# Patient Record
Sex: MALE | Race: WHITE | NOT HISPANIC OR LATINO | Employment: OTHER | URBAN - METROPOLITAN AREA
[De-identification: names, ages, dates, MRNs, and addresses within clinical notes are randomized per-mention and may not be internally consistent; named-entity substitution may affect disease eponyms.]

---

## 2023-09-30 PROBLEM — S82.402A: Status: ACTIVE | Noted: 2023-09-30

## 2023-09-30 PROBLEM — N18.30 CHRONIC KIDNEY DISEASE (CKD), STAGE III (MODERATE) (MULTI): Status: ACTIVE | Noted: 2023-09-30

## 2023-09-30 PROBLEM — M48.061 FORAMINAL STENOSIS OF LUMBAR REGION: Status: ACTIVE | Noted: 2023-09-30

## 2023-09-30 PROBLEM — E83.52 HYPERCALCEMIA: Status: ACTIVE | Noted: 2023-09-30

## 2023-09-30 PROBLEM — M47.22 CERVICAL SPONDYLOSIS WITH RADICULOPATHY: Status: ACTIVE | Noted: 2023-09-30

## 2023-09-30 PROBLEM — E78.5 HYPERLIPIDEMIA: Status: ACTIVE | Noted: 2023-09-30

## 2023-09-30 PROBLEM — D49.2 NERVE SHEATH TUMOR: Status: ACTIVE | Noted: 2023-09-30

## 2023-09-30 PROBLEM — R79.89 ELEVATED SERUM CREATININE: Status: ACTIVE | Noted: 2023-09-30

## 2023-09-30 PROBLEM — M85.80 OSTEOPENIA: Status: ACTIVE | Noted: 2023-09-30

## 2023-09-30 PROBLEM — M41.20 IDIOPATHIC SCOLIOSIS AND KYPHOSCOLIOSIS: Status: ACTIVE | Noted: 2023-09-30

## 2023-09-30 PROBLEM — N20.0 CALCULUS OF KIDNEY: Status: ACTIVE | Noted: 2023-09-30

## 2023-09-30 PROBLEM — M54.2 CERVICALGIA: Status: ACTIVE | Noted: 2023-09-30

## 2023-09-30 PROBLEM — F17.200 CURRENT EVERY DAY SMOKER: Status: ACTIVE | Noted: 2023-09-30

## 2023-09-30 PROBLEM — E79.0 HYPERURICEMIA: Status: ACTIVE | Noted: 2023-09-30

## 2023-09-30 PROBLEM — R91.1 PULMONARY NODULE: Status: ACTIVE | Noted: 2023-09-30

## 2023-09-30 PROBLEM — I72.3 ANEURYSM ARTERY, ILIAC (CMS-HCC): Status: ACTIVE | Noted: 2023-09-30

## 2023-09-30 PROBLEM — Z98.1 STATUS POST CERVICAL SPINAL FUSION: Status: ACTIVE | Noted: 2023-09-30

## 2023-09-30 PROBLEM — R20.2 PARESTHESIA: Status: ACTIVE | Noted: 2023-09-30

## 2023-09-30 PROBLEM — E83.51 HYPOCALCEMIA: Status: ACTIVE | Noted: 2023-09-30

## 2023-09-30 PROBLEM — E55.9 VITAMIN D DEFICIENCY: Status: ACTIVE | Noted: 2023-09-30

## 2023-09-30 PROBLEM — I10 HYPERTENSION: Status: ACTIVE | Noted: 2023-09-30

## 2023-09-30 PROBLEM — E87.6 HYPOKALEMIA: Status: ACTIVE | Noted: 2023-09-30

## 2023-09-30 PROBLEM — M85.89 OTHER SPECIFIED DISORDERS OF BONE DENSITY AND STRUCTURE, MULTIPLE SITES: Status: ACTIVE | Noted: 2023-09-30

## 2023-09-30 PROBLEM — S82.401A: Status: ACTIVE | Noted: 2023-09-30

## 2023-09-30 RX ORDER — TAMSULOSIN HYDROCHLORIDE 0.4 MG/1
1 CAPSULE ORAL DAILY
COMMUNITY
Start: 2015-07-15

## 2023-09-30 RX ORDER — TRAMADOL HYDROCHLORIDE 50 MG/1
50 TABLET ORAL 3 TIMES DAILY PRN
COMMUNITY
End: 2023-10-23 | Stop reason: ALTCHOICE

## 2023-09-30 RX ORDER — VARENICLINE TARTRATE 0.5 (11)-1
KIT ORAL
COMMUNITY
Start: 2022-09-02 | End: 2023-10-23 | Stop reason: ALTCHOICE

## 2023-09-30 RX ORDER — VARENICLINE TARTRATE 1 MG/1
1 TABLET, FILM COATED ORAL 2 TIMES DAILY
COMMUNITY
Start: 2022-09-02 | End: 2023-10-23 | Stop reason: ALTCHOICE

## 2023-09-30 RX ORDER — PRAVASTATIN SODIUM 20 MG/1
1 TABLET ORAL NIGHTLY
COMMUNITY
Start: 2018-03-11 | End: 2023-10-23 | Stop reason: SDUPTHER

## 2023-09-30 RX ORDER — TRAMADOL HYDROCHLORIDE 50 MG/1
50 TABLET ORAL EVERY 12 HOURS PRN
COMMUNITY

## 2023-09-30 RX ORDER — PREDNISONE 5 MG/1
1 TABLET ORAL DAILY
COMMUNITY

## 2023-09-30 RX ORDER — PREDNISONE 2.5 MG/1
2.5 TABLET ORAL DAILY
COMMUNITY
Start: 2023-01-30

## 2023-09-30 RX ORDER — OXYCODONE AND ACETAMINOPHEN 5; 325 MG/1; MG/1
1 TABLET ORAL NIGHTLY
COMMUNITY
Start: 2023-04-12

## 2023-09-30 RX ORDER — DICLOFENAC SODIUM 10 MG/G
4 GEL TOPICAL 4 TIMES DAILY PRN
COMMUNITY

## 2023-09-30 RX ORDER — IRBESARTAN 75 MG/1
1 TABLET ORAL DAILY
COMMUNITY
Start: 2022-06-13 | End: 2024-03-18

## 2023-10-05 ENCOUNTER — APPOINTMENT (OUTPATIENT)
Dept: PRIMARY CARE | Facility: CLINIC | Age: 68
End: 2023-10-05
Payer: MEDICARE

## 2023-10-23 ENCOUNTER — OFFICE VISIT (OUTPATIENT)
Dept: PRIMARY CARE | Facility: CLINIC | Age: 68
End: 2023-10-23
Payer: MEDICARE

## 2023-10-23 VITALS
WEIGHT: 152 LBS | SYSTOLIC BLOOD PRESSURE: 122 MMHG | BODY MASS INDEX: 23.86 KG/M2 | HEART RATE: 71 BPM | HEIGHT: 67 IN | DIASTOLIC BLOOD PRESSURE: 72 MMHG | TEMPERATURE: 98 F

## 2023-10-23 DIAGNOSIS — E78.5 HYPERLIPIDEMIA, UNSPECIFIED HYPERLIPIDEMIA TYPE: Primary | ICD-10-CM

## 2023-10-23 DIAGNOSIS — I10 PRIMARY HYPERTENSION: ICD-10-CM

## 2023-10-23 PROCEDURE — 3078F DIAST BP <80 MM HG: CPT | Performed by: FAMILY MEDICINE

## 2023-10-23 PROCEDURE — 1125F AMNT PAIN NOTED PAIN PRSNT: CPT | Performed by: FAMILY MEDICINE

## 2023-10-23 PROCEDURE — 99213 OFFICE O/P EST LOW 20 MIN: CPT | Performed by: FAMILY MEDICINE

## 2023-10-23 PROCEDURE — 1159F MED LIST DOCD IN RCRD: CPT | Performed by: FAMILY MEDICINE

## 2023-10-23 PROCEDURE — 3074F SYST BP LT 130 MM HG: CPT | Performed by: FAMILY MEDICINE

## 2023-10-23 RX ORDER — PRAVASTATIN SODIUM 20 MG/1
20 TABLET ORAL NIGHTLY
Qty: 90 TABLET | Refills: 1 | Status: SHIPPED | OUTPATIENT
Start: 2023-10-23 | End: 2024-03-21 | Stop reason: SDUPTHER

## 2023-10-23 RX ORDER — SOD SULF/POT CHLORIDE/MAG SULF 1.479 G
TABLET ORAL
COMMUNITY
Start: 2023-05-11 | End: 2023-10-23 | Stop reason: ALTCHOICE

## 2023-10-23 ASSESSMENT — PATIENT HEALTH QUESTIONNAIRE - PHQ9
2. FEELING DOWN, DEPRESSED OR HOPELESS: NOT AT ALL
SUM OF ALL RESPONSES TO PHQ9 QUESTIONS 1 AND 2: 0
1. LITTLE INTEREST OR PLEASURE IN DOING THINGS: NOT AT ALL

## 2023-10-23 NOTE — PROGRESS NOTES
"    /72 (BP Location: Left arm, Patient Position: Sitting, BP Cuff Size: Adult)   Pulse 71   Temp 36.7 °C (98 °F) (Skin)   Ht 1.689 m (5' 6.5\")   Wt 68.9 kg (152 lb)   BMI 24.17 kg/m²     No past medical history on file.    Patient Active Problem List   Diagnosis    Vitamin D deficiency    Status post cervical spinal fusion    Pulmonary nodule    Paresthesia    Other specified disorders of bone density and structure, multiple sites    Osteopenia    Nerve sheath tumor    Hypokalemia    Hyperuricemia    Hypertension    Hyperlipidemia    Hypercalcemia    Foraminal stenosis of lumbar region    Current every day smoker    Chronic kidney disease (CKD), stage III (moderate) (CMS/HCC)    Cervicalgia    Cervical spondylosis with radiculopathy    Calculus of kidney    Bilateral fibular fractures    Aneurysm artery, iliac (CMS/HCC)    Idiopathic scoliosis and kyphoscoliosis    Body mass index (BMI) 24.0-24.9, adult    Elevated serum creatinine       Current Outpatient Medications   Medication Sig Dispense Refill    diclofenac sodium (Voltaren) 1 % gel gel Apply 1 Application topically 4 times a day as needed.      irbesartan (Avapro) 75 mg tablet Take 1 tablet (75 mg) by mouth once daily.      oxyCODONE-acetaminophen (Percocet) 5-325 mg tablet 1 tablet once daily at bedtime.      pravastatin (Pravachol) 20 mg tablet Take 1 tablet (20 mg) by mouth once daily at bedtime.      predniSONE (Deltasone) 2.5 mg tablet 1 tablet (2.5 mg) once daily.      tamsulosin (Flomax) 0.4 mg 24 hr capsule Take 1 capsule (0.4 mg) by mouth once daily.      traMADol (Ultram) 50 mg tablet Take 1 tablet (50 mg) by mouth every 12 hours if needed.      predniSONE (Deltasone) 5 mg tablet Take 1 tablet (5 mg) by mouth once daily.      sod sulf-pot chloride-mag sulf (Sutab) 1.479-0.188- 0.225 gram tablet       traMADol (Ultram) 50 mg tablet Take 1 tablet (50 mg) by mouth 3 times a day as needed.      varenicline (Chantix BALJINDER) 0.5 mg (11)- 1 mg " (42) tablet Take by mouth.  TAKE ONE 0.5MG TABLET DAILY ON DAYS 1-3, THEN ONE 0.5MG TABLET TWICE DAILY ON DAYS 4-7, THEN ONE 1MG TABLET TWICE DAILY THEREAFTER.      varenicline (Chantix) 1 mg tablet Take 1 tablet (1 mg) by mouth 2 times a day.       No current facility-administered medications for this visit.       CC/HPI/ASSESSMENT/PLAN    CC follow-up medicine    HPI patient suffers of hyperlipidemia, hypertension.  Blood pressure under good control.  Patient request refills for medicine.  Patient dealing with chronic low back pain sees pain management on a regular basis.  Denies fever chills chest pain.  Patient will need blood work ordered    Exam calm neck supple lungs CTA CV RRR    A/P 1 hyperlipidemia chronic stable 2 hypertension chronic stable medicine refilled.  Blood work is ordered.    There are no diagnoses linked to this encounter.

## 2023-11-09 LAB
NON-UH HIE A/G RATIO: 1.4
NON-UH HIE ALB: 4.1 G/DL (ref 3.4–5)
NON-UH HIE ALK PHOS: 48 UNIT/L (ref 45–117)
NON-UH HIE BILIRUBIN, TOTAL: 0.6 MG/DL (ref 0.3–1.2)
NON-UH HIE BUN/CREAT RATIO: 15.6
NON-UH HIE BUN: 25 MG/DL (ref 9–23)
NON-UH HIE CALCIUM: 10.8 MG/DL (ref 8.7–10.4)
NON-UH HIE CALCULATED LDL CHOLESTEROL: 82 MG/DL (ref 60–130)
NON-UH HIE CALCULATED OSMOLALITY: 283 MOSM/KG (ref 275–295)
NON-UH HIE CHLORIDE: 106 MMOL/L (ref 98–107)
NON-UH HIE CHOLESTEROL: 160 MG/DL (ref 100–200)
NON-UH HIE CO2, VENOUS: 27 MMOL/L (ref 20–31)
NON-UH HIE CREATININE: 1.6 MG/DL (ref 0.6–1.1)
NON-UH HIE GFR AA: 52
NON-UH HIE GLOBULIN: 3 G/DL
NON-UH HIE GLOMERULAR FILTRATION RATE: 43 ML/MIN/1.73M?
NON-UH HIE GLUCOSE: 91 MG/DL (ref 74–106)
NON-UH HIE GOT: 25 UNIT/L (ref 15–37)
NON-UH HIE GPT: 22 UNIT/L (ref 10–49)
NON-UH HIE HDL CHOLESTEROL: 58 MG/DL (ref 40–60)
NON-UH HIE K: 4 MMOL/L (ref 3.5–5.1)
NON-UH HIE NA: 140 MMOL/L (ref 135–145)
NON-UH HIE TOTAL CHOL/HDL CHOL RATIO: 2.8
NON-UH HIE TOTAL PROTEIN: 7.1 G/DL (ref 5.7–8.2)
NON-UH HIE TRIGLYCERIDES: 101 MG/DL (ref 30–150)

## 2023-12-29 LAB
NON-UH HIE BUN/CREAT RATIO: 18.5
NON-UH HIE BUN: 24 MG/DL (ref 9–23)
NON-UH HIE CALCIUM: 10.8 MG/DL (ref 8.7–10.4)
NON-UH HIE CALCULATED OSMOLALITY: 280 MOSM/KG (ref 275–295)
NON-UH HIE CHLORIDE: 107 MMOL/L (ref 98–107)
NON-UH HIE CO2, VENOUS: 24 MMOL/L (ref 20–31)
NON-UH HIE CREATININE: 1.3 MG/DL (ref 0.6–1.1)
NON-UH HIE GFR AA: >60
NON-UH HIE GLOMERULAR FILTRATION RATE: 55 ML/MIN/1.73M?
NON-UH HIE GLUCOSE: 93 MG/DL (ref 74–106)
NON-UH HIE I-PTH: 19 PG/ML (ref 18.4–80.1)
NON-UH HIE K: 4.7 MMOL/L (ref 3.5–5.1)
NON-UH HIE NA: 138 MMOL/L (ref 135–145)
NON-UH HIE PHOSPHORUS: 3 MG/DL (ref 2–5.1)
NON-UH HIE VIT D 25: 23 NG/ML

## 2024-01-18 ENCOUNTER — OFFICE VISIT (OUTPATIENT)
Dept: NEPHROLOGY | Facility: CLINIC | Age: 69
End: 2024-01-18
Payer: MEDICARE

## 2024-01-18 VITALS
BODY MASS INDEX: 24.01 KG/M2 | SYSTOLIC BLOOD PRESSURE: 132 MMHG | DIASTOLIC BLOOD PRESSURE: 71 MMHG | HEIGHT: 67 IN | HEART RATE: 85 BPM | WEIGHT: 153 LBS

## 2024-01-18 DIAGNOSIS — N18.31 STAGE 3A CHRONIC KIDNEY DISEASE (MULTI): Primary | ICD-10-CM

## 2024-01-18 PROCEDURE — 99213 OFFICE O/P EST LOW 20 MIN: CPT | Performed by: INTERNAL MEDICINE

## 2024-01-18 PROCEDURE — 3075F SYST BP GE 130 - 139MM HG: CPT | Performed by: INTERNAL MEDICINE

## 2024-01-18 PROCEDURE — 3078F DIAST BP <80 MM HG: CPT | Performed by: INTERNAL MEDICINE

## 2024-01-18 PROCEDURE — 1125F AMNT PAIN NOTED PAIN PRSNT: CPT | Performed by: INTERNAL MEDICINE

## 2024-01-18 PROCEDURE — 1159F MED LIST DOCD IN RCRD: CPT | Performed by: INTERNAL MEDICINE

## 2024-01-18 NOTE — PROGRESS NOTES
Augustin Swansonjasonaileen   68 y.o.    @@  [facility]/Room: [id number]/Room/bed info not found    Subjective:   The patient is being seen for a routine clinic follow-up of chronic kidney disease. Recently, the disease has been stable. Disease complications:  No hyperkalemia, no hypocalcemia, no hyperphosphatemia, no metabolic acidosis, no coagulopathy, no uremic encephalopathy, no neuropathy and no renal osteodystrophy. The patient is currently asymptomatic. No associated symptoms are reported.       Meds:   Current Outpatient Medications   Medication Sig Dispense Refill    diclofenac sodium (Voltaren) 1 % gel gel Apply 1 Application topically 4 times a day as needed.      irbesartan (Avapro) 75 mg tablet Take 1 tablet (75 mg) by mouth once daily.      oxyCODONE-acetaminophen (Percocet) 5-325 mg tablet 1 tablet once daily at bedtime.      pravastatin (Pravachol) 20 mg tablet Take 1 tablet (20 mg) by mouth once daily at bedtime. 90 tablet 1    predniSONE (Deltasone) 2.5 mg tablet 1 tablet (2.5 mg) once daily.      predniSONE (Deltasone) 5 mg tablet Take 1 tablet (5 mg) by mouth once daily.      tamsulosin (Flomax) 0.4 mg 24 hr capsule Take 1 capsule (0.4 mg) by mouth once daily.      traMADol (Ultram) 50 mg tablet Take 1 tablet (50 mg) by mouth every 12 hours if needed.       No current facility-administered medications for this visit.          ROS:  The patient is awake and oriented. No dizziness or lightheadedness. No chills and no fever. No headaches. No nausea and no vomiting. No shortness of breath. No cough. No sputum. No chest pain. No chest tightness. No abdominal pain. No diarrhea and no constipation. No hematemesis or hemoptysis. No hematuria. No rectal bleeding. No melena. No epistaxis. No urinary symptoms. No flank pain. No leg edema. No leg pain. No weakness. No itching. Overall, the rest of the review of systems is also negative.  12 point review of systems otherwise negative as stated in HPI.        Physical  "Examination:        Vitals:    01/18/24 0912   BP: 132/71   Pulse: 85     General: The patient is awake, oriented, and is not in any distress.  Head and Neck: Normocephalic. No periorbital edema.  Eyes: non-icteric  Respiratory: Symmetric air entry. Symmetric chest expansion.No respiratory distress.  Cardiovascular: Symmetric peripheral pulses.  Skin: No maculopapular rash.  Abdomen: soft, nt/nd  Musculoskeletal: No peripheral edema in both left and right upper extremities.  No edema in either left or right lower extremities.  Neuro Exam: Speech is fluent. Moves extremities.    Imaging:         Blood Labs:  No results found for this or any previous visit (from the past 24 hour(s)).   No results found for: \"BMPR1A\", \"PTH\", \"PROTUR\", \"PHOS\"   No results found for: \"GLUCOSE\", \"CALCIUM\", \"NA\", \"K\", \"CO2\", \"CL\", \"BUN\", \"CREATININE\"      Assessment and Plan:  1. Chronic kidney disease stage II/III. Last creatinine level is 1.3. Normal K level. Normal bicarb level. Normal PTH, phosphorus, and 25-hydroxy vitamin D level.      2. Hypertension. Blood pressure is under control. Continue the current medications.     3. Lipidemia. He is on pravastatin.    4.  Hypercalcemia.  With normal PTH level.  Probably primary hyperparathyroidism.  He is following with his endocrinologist.     I will see him in about 6 months for follow-up.           Bobo Huber MD     "

## 2024-03-20 PROBLEM — I77.819 AORTIC ECTASIA (CMS-HCC): Status: ACTIVE | Noted: 2024-03-20

## 2024-03-20 PROBLEM — R63.4 WEIGHT LOSS: Status: ACTIVE | Noted: 2024-03-20

## 2024-03-20 PROBLEM — Z86.2 HISTORY OF ANEMIA: Status: ACTIVE | Noted: 2024-03-20

## 2024-03-20 PROBLEM — R10.9 ABDOMINAL PAIN: Status: ACTIVE | Noted: 2024-03-20

## 2024-03-20 PROBLEM — M54.50 LOWER BACK PAIN: Status: ACTIVE | Noted: 2024-03-20

## 2024-03-20 PROBLEM — M15.9 OSTEOARTHRITIS, MULTIPLE SITES: Status: ACTIVE | Noted: 2024-03-20

## 2024-03-20 PROBLEM — N40.0 BPH (BENIGN PROSTATIC HYPERPLASIA): Status: ACTIVE | Noted: 2024-03-20

## 2024-03-20 PROBLEM — N20.1 LEFT URETERAL CALCULUS: Status: ACTIVE | Noted: 2024-03-20

## 2024-03-20 PROBLEM — M96.1 LUMBAR POST-LAMINECTOMY SYNDROME: Status: ACTIVE | Noted: 2023-08-22

## 2024-03-20 PROBLEM — M47.816 LUMBAR SPONDYLOSIS: Status: ACTIVE | Noted: 2023-09-30

## 2024-03-20 PROBLEM — F17.200 TOBACCO DEPENDENCE SYNDROME: Status: ACTIVE | Noted: 2024-03-20

## 2024-03-21 ENCOUNTER — OFFICE VISIT (OUTPATIENT)
Dept: PRIMARY CARE | Facility: CLINIC | Age: 69
End: 2024-03-21
Payer: MEDICARE

## 2024-03-21 VITALS
DIASTOLIC BLOOD PRESSURE: 68 MMHG | SYSTOLIC BLOOD PRESSURE: 108 MMHG | HEIGHT: 67 IN | TEMPERATURE: 97.9 F | HEART RATE: 72 BPM | BODY MASS INDEX: 24.04 KG/M2 | WEIGHT: 153.2 LBS

## 2024-03-21 DIAGNOSIS — E78.5 HYPERLIPIDEMIA, UNSPECIFIED HYPERLIPIDEMIA TYPE: Primary | ICD-10-CM

## 2024-03-21 DIAGNOSIS — H66.92 LEFT OTITIS MEDIA, UNSPECIFIED OTITIS MEDIA TYPE: ICD-10-CM

## 2024-03-21 DIAGNOSIS — I10 ESSENTIAL (PRIMARY) HYPERTENSION: ICD-10-CM

## 2024-03-21 PROCEDURE — 1159F MED LIST DOCD IN RCRD: CPT | Performed by: FAMILY MEDICINE

## 2024-03-21 PROCEDURE — 3078F DIAST BP <80 MM HG: CPT | Performed by: FAMILY MEDICINE

## 2024-03-21 PROCEDURE — 1160F RVW MEDS BY RX/DR IN RCRD: CPT | Performed by: FAMILY MEDICINE

## 2024-03-21 PROCEDURE — 3074F SYST BP LT 130 MM HG: CPT | Performed by: FAMILY MEDICINE

## 2024-03-21 PROCEDURE — 99214 OFFICE O/P EST MOD 30 MIN: CPT | Performed by: FAMILY MEDICINE

## 2024-03-21 RX ORDER — DOXYCYCLINE 100 MG/1
100 CAPSULE ORAL 2 TIMES DAILY
Qty: 20 CAPSULE | Refills: 0 | Status: SHIPPED | OUTPATIENT
Start: 2024-03-21 | End: 2024-03-31

## 2024-03-21 RX ORDER — IRBESARTAN 75 MG/1
75 TABLET ORAL DAILY
Qty: 90 TABLET | Refills: 1 | Status: SHIPPED | OUTPATIENT
Start: 2024-03-21

## 2024-03-21 RX ORDER — PRAVASTATIN SODIUM 20 MG/1
20 TABLET ORAL NIGHTLY
Qty: 90 TABLET | Refills: 1 | Status: SHIPPED | OUTPATIENT
Start: 2024-03-21 | End: 2025-03-21

## 2024-03-21 NOTE — PROGRESS NOTES
"    /68   Pulse 72   Temp 36.6 °C (97.9 °F)   Ht 1.689 m (5' 6.5\")   Wt 69.5 kg (153 lb 3.2 oz)   BMI 24.36 kg/m²     No past medical history on file.    Patient Active Problem List   Diagnosis    Vitamin D deficiency    Status post cervical spinal fusion    Pulmonary nodule    Paresthesia    Other specified disorders of bone density and structure, multiple sites    Osteopenia    Nerve sheath tumor    Hypokalemia    Hyperuricemia    HTN (hypertension)    HLD (hyperlipidemia)    Hypercalcemia    Foraminal stenosis of lumbar region    Current every day smoker    Chronic kidney disease (CKD), stage III (moderate) (CMS/HCC)    Cervicalgia    Lumbar spondylosis    Renal calculi    Bilateral fibular fractures    Iliac artery aneurysm (CMS/HCC)    Idiopathic scoliosis and kyphoscoliosis    Body mass index (BMI) 24.0-24.9, adult    Elevated serum creatinine    Abdominal pain    Anemia    Aortic ectasia (CMS/HCC)    BPH (benign prostatic hyperplasia)    Diaphragmatic hernia    History of anemia    Left ureteral calculus    Lower back pain    Lumbar post-laminectomy syndrome    Osteoarthritis, multiple sites    Tobacco dependence syndrome    Weight loss       Current Outpatient Medications   Medication Sig Dispense Refill    diclofenac sodium (Voltaren) 1 % gel gel Apply 4.5 inches (4 g) topically 4 times a day as needed.      irbesartan (Avapro) 75 mg tablet TAKE 1 TABLET BY MOUTH EVERY DAY 90 tablet 0    oxyCODONE-acetaminophen (Percocet) 5-325 mg tablet 1 tablet once daily at bedtime.      pravastatin (Pravachol) 20 mg tablet Take 1 tablet (20 mg) by mouth once daily at bedtime. 90 tablet 1    predniSONE (Deltasone) 2.5 mg tablet 1 tablet (2.5 mg) once daily.      predniSONE (Deltasone) 5 mg tablet Take 1 tablet (5 mg) by mouth once daily.      tamsulosin (Flomax) 0.4 mg 24 hr capsule Take 1 capsule (0.4 mg) by mouth once daily.      traMADol (Ultram) 50 mg tablet Take 1 tablet (50 mg) by mouth every 12 hours if " needed.       No current facility-administered medications for this visit.       CC/HPI/ASSESSMENT/PLAN    CC follow medication    HPI patient suffers from hypertension hyperlipidemia.  Request refills.  Blood pressure under good control.  Patient continues to smoke.  Denies fever chills chest pain.  Complains of discomfort and a fullness in the left ear for the last few weeks.  He had blood work 4 months ago that showed good control of his cholesterol levels.  His kidney function is abnormal creatinine was 1.6 last check was 1.3.  He has follow-up with nephrology in July.  ROS negative except noted above past medical social surgical history is reviewed    Exam calm neck supple lungs CTA CV RRR Ext no edema ear exam reveals fluid behind left TM canals are clear    A/P 1 hyperlipidemia chronic stable meds refilled.  Blood work ordered.  2 hypertension chronic stable meds refilled blood work ordered.  3 left otitis media.  Doxycycline ordered.  Recommended Flonase twice daily each nostril for 10 days.  Follow-up 6 months    There are no diagnoses linked to this encounter.

## 2024-06-20 LAB
NON-UH HIE A/G RATIO: 1.1
NON-UH HIE ALB: 3.9 G/DL (ref 3.4–5)
NON-UH HIE ALK PHOS: 54 UNIT/L (ref 45–117)
NON-UH HIE BILIRUBIN, TOTAL: 0.5 MG/DL (ref 0.3–1.2)
NON-UH HIE BUN/CREAT RATIO: 15
NON-UH HIE BUN: 21 MG/DL (ref 9–23)
NON-UH HIE CALCIUM: 11.1 MG/DL (ref 8.7–10.4)
NON-UH HIE CALCULATED LDL CHOLESTEROL: 101 MG/DL (ref 60–130)
NON-UH HIE CALCULATED OSMOLALITY: 286 MOSM/KG (ref 275–295)
NON-UH HIE CHLORIDE: 110 MMOL/L (ref 98–107)
NON-UH HIE CHOLESTEROL: 172 MG/DL (ref 100–200)
NON-UH HIE CO2, VENOUS: 26 MMOL/L (ref 20–31)
NON-UH HIE CREATININE: 1.4 MG/DL (ref 0.6–1.1)
NON-UH HIE GFR AA: >60
NON-UH HIE GLOBULIN: 3.4 G/DL
NON-UH HIE GLOMERULAR FILTRATION RATE: 50 ML/MIN/1.73M?
NON-UH HIE GLUCOSE: 93 MG/DL (ref 74–106)
NON-UH HIE GOT: 24 UNIT/L (ref 15–37)
NON-UH HIE GPT: 30 UNIT/L (ref 10–49)
NON-UH HIE HDL CHOLESTEROL: 52 MG/DL (ref 40–60)
NON-UH HIE K: 3.8 MMOL/L (ref 3.5–5.1)
NON-UH HIE NA: 142 MMOL/L (ref 135–145)
NON-UH HIE TOTAL CHOL/HDL CHOL RATIO: 3.3
NON-UH HIE TOTAL PROTEIN: 7.3 G/DL (ref 5.7–8.2)
NON-UH HIE TRIGLYCERIDES: 94 MG/DL (ref 30–150)

## 2024-06-28 ENCOUNTER — TELEPHONE (OUTPATIENT)
Dept: NEPHROLOGY | Facility: CLINIC | Age: 69
End: 2024-06-28
Payer: MEDICARE

## 2024-06-28 DIAGNOSIS — N18.31 STAGE 3A CHRONIC KIDNEY DISEASE (MULTI): Primary | ICD-10-CM

## 2024-06-28 NOTE — TELEPHONE ENCOUNTER
Pt has an apt on 7/18 and is wondering if he needs labs done before next OV. I did not see any inn his chart Please advise. Thank you.

## 2024-07-02 LAB
NON-UH HIE BUN/CREAT RATIO: 16.7
NON-UH HIE BUN: 25 MG/DL (ref 9–23)
NON-UH HIE CALCIUM: 10.8 MG/DL (ref 8.7–10.4)
NON-UH HIE CALCULATED OSMOLALITY: 285 MOSM/KG (ref 275–295)
NON-UH HIE CHLORIDE: 110 MMOL/L (ref 98–107)
NON-UH HIE CO2, VENOUS: 25 MMOL/L (ref 20–31)
NON-UH HIE CREATININE: 1.5 MG/DL (ref 0.6–1.1)
NON-UH HIE GFR AA: 56
NON-UH HIE GLOMERULAR FILTRATION RATE: 46 ML/MIN/1.73M?
NON-UH HIE GLUCOSE: 92 MG/DL (ref 74–106)
NON-UH HIE K: 4 MMOL/L (ref 3.5–5.1)
NON-UH HIE NA: 141 MMOL/L (ref 135–145)

## 2024-07-18 ENCOUNTER — APPOINTMENT (OUTPATIENT)
Dept: NEPHROLOGY | Facility: CLINIC | Age: 69
End: 2024-07-18
Payer: MEDICARE

## 2024-07-18 ENCOUNTER — OFFICE VISIT (OUTPATIENT)
Dept: NEPHROLOGY | Facility: CLINIC | Age: 69
End: 2024-07-18
Payer: MEDICARE

## 2024-07-18 VITALS
DIASTOLIC BLOOD PRESSURE: 78 MMHG | HEIGHT: 67 IN | SYSTOLIC BLOOD PRESSURE: 131 MMHG | BODY MASS INDEX: 23.7 KG/M2 | WEIGHT: 151 LBS | HEART RATE: 71 BPM

## 2024-07-18 DIAGNOSIS — N18.31 STAGE 3A CHRONIC KIDNEY DISEASE (MULTI): Primary | ICD-10-CM

## 2024-07-18 PROCEDURE — 1159F MED LIST DOCD IN RCRD: CPT | Performed by: INTERNAL MEDICINE

## 2024-07-18 PROCEDURE — 3078F DIAST BP <80 MM HG: CPT | Performed by: INTERNAL MEDICINE

## 2024-07-18 PROCEDURE — 3008F BODY MASS INDEX DOCD: CPT | Performed by: INTERNAL MEDICINE

## 2024-07-18 PROCEDURE — 99213 OFFICE O/P EST LOW 20 MIN: CPT | Performed by: INTERNAL MEDICINE

## 2024-07-18 PROCEDURE — 3075F SYST BP GE 130 - 139MM HG: CPT | Performed by: INTERNAL MEDICINE

## 2024-07-18 NOTE — PROGRESS NOTES
Augustin Alireza   68 y.o.    @@  [facility]/Room: [id number]/Room/bed info not found    Subjective:   The patient is being seen for a routine clinic follow-up of chronic kidney disease. Recently, the disease has been stable. Disease complications:  No hyperkalemia, no hypocalcemia, no hyperphosphatemia, no metabolic acidosis, no coagulopathy, no uremic encephalopathy, no neuropathy and no renal osteodystrophy. The patient is currently asymptomatic. No associated symptoms are reported.       Meds:   Current Outpatient Medications   Medication Sig Dispense Refill    diclofenac sodium (Voltaren) 1 % gel gel Apply 4.5 inches (4 g) topically 4 times a day as needed.      irbesartan (Avapro) 75 mg tablet Take 1 tablet (75 mg) by mouth once daily. 90 tablet 1    oxyCODONE-acetaminophen (Percocet) 5-325 mg tablet 1 tablet once daily at bedtime.      pravastatin (Pravachol) 20 mg tablet Take 1 tablet (20 mg) by mouth once daily at bedtime. 90 tablet 1    predniSONE (Deltasone) 2.5 mg tablet 1 tablet (2.5 mg) once daily.      predniSONE (Deltasone) 5 mg tablet Take 1 tablet (5 mg) by mouth once daily.      tamsulosin (Flomax) 0.4 mg 24 hr capsule Take 1 capsule (0.4 mg) by mouth once daily.      traMADol (Ultram) 50 mg tablet Take 1 tablet (50 mg) by mouth every 12 hours if needed.       No current facility-administered medications for this visit.          ROS:  The patient is awake and oriented. No dizziness or lightheadedness. No chills and no fever. No headaches. No nausea and no vomiting. No shortness of breath. No cough. No sputum. No chest pain. No chest tightness. No abdominal pain. No diarrhea and no constipation. No hematemesis or hemoptysis. No hematuria. No rectal bleeding. No melena. No epistaxis. No urinary symptoms. No flank pain. No leg edema. No leg pain. No weakness. No itching. Overall, the rest of the review of systems is also negative.  12 point review of systems otherwise negative as stated in  "HPI.        Physical Examination:        Vitals:    07/18/24 1425   BP: 131/78   Pulse: 71     General: The patient is awake, oriented, and is not in any distress.  Head and Neck: Normocephalic. No periorbital edema.  Eyes: non-icteric  Respiratory: Symmetric air entry. Symmetric chest expansion.No respiratory distress.  Cardiovascular: Symmetric peripheral pulses.  Skin: No maculopapular rash.  Abdomen: soft, nt/nd  Musculoskeletal: No peripheral edema in both left and right upper extremities.  No edema in either left or right lower extremities.  Neuro Exam: Speech is fluent. Moves extremities.    Imaging:         Blood Labs:  No results found for this or any previous visit (from the past 24 hour(s)).   No results found for: \"BMPR1A\", \"PTH\", \"PROTUR\", \"PHOS\"   No results found for: \"GLUCOSE\", \"CALCIUM\", \"NA\", \"K\", \"CO2\", \"CL\", \"BUN\", \"CREATININE\"      Assessment and Plan:  1. Chronic kidney disease stage II/III. Last creatinine level is 1.4. Normal K level. Normal bicarb level.      2. Hypertension. Blood pressure is under control. Continue the current medications.     3. Lipidemia. He is on pravastatin.     4.  Hypercalcemia.  With normal PTH level.  Probably primary hyperparathyroidism.  He is following with his endocrinologist.     I will see him in about 6 months for follow-up.           Bobo Huber MD  Senior Attending Physician  Director of Onco-Nephrology Program  Division of Nephrology & Hypertension  [facility]  "

## 2024-10-04 ENCOUNTER — OFFICE VISIT (OUTPATIENT)
Dept: PRIMARY CARE | Facility: CLINIC | Age: 69
End: 2024-10-04
Payer: MEDICARE

## 2024-10-04 VITALS
BODY MASS INDEX: 23.57 KG/M2 | HEIGHT: 67 IN | TEMPERATURE: 98 F | HEART RATE: 80 BPM | WEIGHT: 150.2 LBS | DIASTOLIC BLOOD PRESSURE: 68 MMHG | SYSTOLIC BLOOD PRESSURE: 118 MMHG

## 2024-10-04 DIAGNOSIS — I10 ESSENTIAL (PRIMARY) HYPERTENSION: ICD-10-CM

## 2024-10-04 DIAGNOSIS — E78.5 HYPERLIPIDEMIA, UNSPECIFIED HYPERLIPIDEMIA TYPE: Primary | ICD-10-CM

## 2024-10-04 PROBLEM — G89.4 CHRONIC PAIN DISORDER: Status: ACTIVE | Noted: 2024-10-04

## 2024-10-04 PROCEDURE — 1159F MED LIST DOCD IN RCRD: CPT | Performed by: FAMILY MEDICINE

## 2024-10-04 PROCEDURE — 3074F SYST BP LT 130 MM HG: CPT | Performed by: FAMILY MEDICINE

## 2024-10-04 PROCEDURE — 1158F ADVNC CARE PLAN TLK DOCD: CPT | Performed by: FAMILY MEDICINE

## 2024-10-04 PROCEDURE — 90662 IIV NO PRSV INCREASED AG IM: CPT | Performed by: FAMILY MEDICINE

## 2024-10-04 PROCEDURE — 99214 OFFICE O/P EST MOD 30 MIN: CPT | Performed by: FAMILY MEDICINE

## 2024-10-04 PROCEDURE — G0008 ADMIN INFLUENZA VIRUS VAC: HCPCS | Performed by: FAMILY MEDICINE

## 2024-10-04 PROCEDURE — 1123F ACP DISCUSS/DSCN MKR DOCD: CPT | Performed by: FAMILY MEDICINE

## 2024-10-04 PROCEDURE — 3078F DIAST BP <80 MM HG: CPT | Performed by: FAMILY MEDICINE

## 2024-10-04 PROCEDURE — 3008F BODY MASS INDEX DOCD: CPT | Performed by: FAMILY MEDICINE

## 2024-10-04 RX ORDER — IRBESARTAN 75 MG/1
75 TABLET ORAL DAILY
Qty: 90 TABLET | Refills: 1 | Status: SHIPPED | OUTPATIENT
Start: 2024-10-04

## 2024-10-04 RX ORDER — PRAVASTATIN SODIUM 20 MG/1
20 TABLET ORAL NIGHTLY
Qty: 90 TABLET | Refills: 1 | Status: SHIPPED | OUTPATIENT
Start: 2024-10-04 | End: 2025-10-04

## 2024-10-04 ASSESSMENT — PATIENT HEALTH QUESTIONNAIRE - PHQ9
1. LITTLE INTEREST OR PLEASURE IN DOING THINGS: NOT AT ALL
2. FEELING DOWN, DEPRESSED OR HOPELESS: NOT AT ALL
SUM OF ALL RESPONSES TO PHQ9 QUESTIONS 1 AND 2: 0

## 2024-10-04 NOTE — PROGRESS NOTES
"    /68   Pulse 80   Temp 36.7 °C (98 °F)   Ht 1.689 m (5' 6.5\")   Wt 68.1 kg (150 lb 3.2 oz)   BMI 23.88 kg/m²     No past medical history on file.    Patient Active Problem List   Diagnosis    Vitamin D deficiency    Status post cervical spinal fusion    Pulmonary nodule    Paresthesia    Other specified disorders of bone density and structure, multiple sites    Osteopenia    Nerve sheath tumor    Hypokalemia    Hyperuricemia    Essential (primary) hypertension    HLD (hyperlipidemia)    Hypercalcemia    Foraminal stenosis of lumbar region    Current every day smoker    Chronic kidney disease (CKD), stage III (moderate) (Multi)    Cervicalgia    Lumbar spondylosis    Renal calculi    Bilateral fibular fractures    Iliac artery aneurysm (CMS-HCC)    Idiopathic scoliosis and kyphoscoliosis    Body mass index (BMI) 24.0-24.9, adult    Elevated serum creatinine    Abdominal pain    Anemia    Aortic ectasia    BPH (benign prostatic hyperplasia)    Diaphragmatic hernia    History of anemia    Left ureteral calculus    Lower back pain    Lumbar post-laminectomy syndrome    Osteoarthritis, multiple sites    Tobacco dependence syndrome    Weight loss    Left otitis media    Chronic pain disorder       Current Outpatient Medications   Medication Sig Dispense Refill    diclofenac sodium (Voltaren) 1 % gel gel Apply 4.5 inches (4 g) topically 4 times a day as needed.      irbesartan (Avapro) 75 mg tablet Take 1 tablet (75 mg) by mouth once daily. 90 tablet 1    oxyCODONE-acetaminophen (Percocet) 5-325 mg tablet 1 tablet once daily at bedtime.      pravastatin (Pravachol) 20 mg tablet Take 1 tablet (20 mg) by mouth once daily at bedtime. 90 tablet 1    predniSONE (Deltasone) 2.5 mg tablet 1 tablet (2.5 mg) once daily.      predniSONE (Deltasone) 5 mg tablet Take 1 tablet (5 mg) by mouth once daily.      tamsulosin (Flomax) 0.4 mg 24 hr capsule Take 1 capsule (0.4 mg) by mouth once daily.      traMADol (Ultram) 50 mg " tablet Take 1 tablet (50 mg) by mouth every 12 hours if needed.       No current facility-administered medications for this visit.       CC/HPI/ASSESSMENT/PLAN    CC follow-up medication    HPI patient with history of hypertension hyperlipidemia, patient notes he is feeling well.  He does suffer from severe low back pain.  He sees pain management on a regular basis.  He is trying to avoid surgery.  Continues to smoke, is advised quit smoking.  Denies fever chills chest pain palpitation short of breath.  Request refills of medication.  He sees nephrology for mild kidney disease, his kidney functions been stable.  He will need repeat blood work as well.  ROS negative except as noted above.  Past medical social history reviewed    Exam: Vital stable eyes no jaundice neck supple no carotid bruit lungs CTA good air exchange CV RRR no murmur    A/P 1 hyperlipidemia chronic stable 2 hypertension chronic stable.  Medicines refilled.  Blood work is ordered.  Patient received flu shot.  Follow-up 6 months    There are no diagnoses linked to this encounter.

## 2024-12-18 LAB
NON-UH HIE A/G RATIO: 1.3
NON-UH HIE ALB: 4 G/DL (ref 3.4–5)
NON-UH HIE ALK PHOS: 45 UNIT/L (ref 45–117)
NON-UH HIE BILIRUBIN, TOTAL: 0.4 MG/DL (ref 0.3–1.2)
NON-UH HIE BUN/CREAT RATIO: 16
NON-UH HIE BUN: 24 MG/DL (ref 9–23)
NON-UH HIE CALCIUM, IONIZED: 1.31 MMOL/L (ref 1.12–1.32)
NON-UH HIE CALCIUM: 11.1 MG/DL (ref 8.7–10.4)
NON-UH HIE CALCULATED OSMOLALITY: 285 MOSM/KG (ref 275–295)
NON-UH HIE CHLORIDE: 111 MMOL/L (ref 98–107)
NON-UH HIE CO2, VENOUS: 25 MMOL/L (ref 20–31)
NON-UH HIE CREATININE: 1.5 MG/DL (ref 0.6–1.1)
NON-UH HIE FREE T4: 1.15 NG/DL (ref 0.89–1.76)
NON-UH HIE GFR AA: 56
NON-UH HIE GLOBULIN: 3.1 G/DL
NON-UH HIE GLOMERULAR FILTRATION RATE: 46 ML/MIN/1.73M?
NON-UH HIE GLUCOSE: 88 MG/DL (ref 74–106)
NON-UH HIE GOT: 24 UNIT/L (ref 15–37)
NON-UH HIE GPT: 18 UNIT/L (ref 10–49)
NON-UH HIE I-PTH: 21 PG/ML (ref 18.4–80.1)
NON-UH HIE K: 4.4 MMOL/L (ref 3.5–5.1)
NON-UH HIE MAGNESIUM: 2 MG/DL (ref 1.6–2.6)
NON-UH HIE NA: 141 MMOL/L (ref 135–145)
NON-UH HIE PHOSPHORUS: 3.6 MG/DL (ref 2–5.1)
NON-UH HIE TOTAL PROTEIN: 7.1 G/DL (ref 5.7–8.2)
NON-UH HIE TSH: 1.38 UIU/ML (ref 0.55–4.78)
NON-UH HIE VIT D 25: 21 NG/ML

## 2024-12-21 LAB — NON-UH HIE MISC SENDOUT: NORMAL

## 2024-12-27 LAB — Lab: 3.4 (ref 0–2.3)

## 2025-01-15 LAB
NON-UH HIE BUN/CREAT RATIO: 16.7
NON-UH HIE BUN: 25 MG/DL (ref 9–23)
NON-UH HIE CALCIUM: 11.4 MG/DL (ref 8.7–10.4)
NON-UH HIE CALCULATED LDL CHOLESTEROL: 110 MG/DL (ref 60–130)
NON-UH HIE CALCULATED OSMOLALITY: 280 MOSM/KG (ref 275–295)
NON-UH HIE CHLORIDE: 104 MMOL/L (ref 98–107)
NON-UH HIE CHOLESTEROL: 186 MG/DL (ref 100–200)
NON-UH HIE CO2, VENOUS: 25 MMOL/L (ref 20–31)
NON-UH HIE CREATININE: 1.5 MG/DL (ref 0.6–1.1)
NON-UH HIE GFR AA: 56
NON-UH HIE GLOMERULAR FILTRATION RATE: 46 ML/MIN/1.73M?
NON-UH HIE GLUCOSE: 89 MG/DL (ref 74–106)
NON-UH HIE HDL CHOLESTEROL: 56 MG/DL (ref 40–60)
NON-UH HIE K: 4.3 MMOL/L (ref 3.5–5.1)
NON-UH HIE NA: 138 MMOL/L (ref 135–145)
NON-UH HIE TOTAL CHOL/HDL CHOL RATIO: 3.3
NON-UH HIE TRIGLYCERIDES: 100 MG/DL (ref 30–150)

## 2025-01-23 ENCOUNTER — APPOINTMENT (OUTPATIENT)
Dept: NEPHROLOGY | Facility: CLINIC | Age: 70
End: 2025-01-23
Payer: MEDICARE

## 2025-01-23 VITALS
BODY MASS INDEX: 24.77 KG/M2 | HEIGHT: 67 IN | HEART RATE: 76 BPM | SYSTOLIC BLOOD PRESSURE: 122 MMHG | DIASTOLIC BLOOD PRESSURE: 68 MMHG | WEIGHT: 157.8 LBS

## 2025-01-23 DIAGNOSIS — N18.31 STAGE 3A CHRONIC KIDNEY DISEASE (MULTI): Primary | ICD-10-CM

## 2025-01-23 PROCEDURE — 1123F ACP DISCUSS/DSCN MKR DOCD: CPT | Performed by: INTERNAL MEDICINE

## 2025-01-23 PROCEDURE — 3074F SYST BP LT 130 MM HG: CPT | Performed by: INTERNAL MEDICINE

## 2025-01-23 PROCEDURE — 99213 OFFICE O/P EST LOW 20 MIN: CPT | Performed by: INTERNAL MEDICINE

## 2025-01-23 PROCEDURE — 3008F BODY MASS INDEX DOCD: CPT | Performed by: INTERNAL MEDICINE

## 2025-01-23 PROCEDURE — 1159F MED LIST DOCD IN RCRD: CPT | Performed by: INTERNAL MEDICINE

## 2025-01-23 PROCEDURE — 3078F DIAST BP <80 MM HG: CPT | Performed by: INTERNAL MEDICINE

## 2025-01-23 RX ORDER — ERGOCALCIFEROL 1.25 MG/1
1 CAPSULE ORAL
COMMUNITY
Start: 2025-01-09

## 2025-01-23 NOTE — PROGRESS NOTES
Augustin Alireza   69 y.o.    @@  [facility]/Room: [id number]/Room/bed info not found    Subjective:   The patient is being seen for a routine clinic follow-up of chronic kidney disease. Recently, the disease has been stable. Disease complications:  No hyperkalemia, no hypocalcemia, no hyperphosphatemia, no metabolic acidosis, no coagulopathy, no uremic encephalopathy, no neuropathy and no renal osteodystrophy. The patient is currently asymptomatic. No associated symptoms are reported.       Meds:   Current Outpatient Medications   Medication Sig Dispense Refill    diclofenac sodium (Voltaren) 1 % gel gel Apply 4.5 inches (4 g) topically 4 times a day as needed.      ergocalciferol (Vitamin D-2) 1.25 MG (82275 UT) capsule Take 1 capsule (1,250 mcg) by mouth every 30 (thirty) days.      irbesartan (Avapro) 75 mg tablet Take 1 tablet (75 mg) by mouth once daily. 90 tablet 1    oxyCODONE-acetaminophen (Percocet) 5-325 mg tablet 1 tablet once daily at bedtime.      pravastatin (Pravachol) 20 mg tablet Take 1 tablet (20 mg) by mouth once daily at bedtime. 90 tablet 1    predniSONE (Deltasone) 2.5 mg tablet 1 tablet (2.5 mg) once daily.      predniSONE (Deltasone) 5 mg tablet Take 1 tablet (5 mg) by mouth once daily.      tamsulosin (Flomax) 0.4 mg 24 hr capsule Take 1 capsule (0.4 mg) by mouth once daily.      traMADol (Ultram) 50 mg tablet Take 1 tablet (50 mg) by mouth every 12 hours if needed.       No current facility-administered medications for this visit.          ROS:  The patient is awake and oriented. No dizziness or lightheadedness. No chills and no fever. No headaches. No nausea and no vomiting. No shortness of breath. No cough. No chest pain. No abdominal pain. No diarrhea. No hematemesis or hemoptysis. No hematuria. No rectal bleeding. No melena. No epistaxis. No urinary symptoms. No flank pain. No leg edema. No itching. Overall, the rest of the review of systems is also negative.  12 point review of systems  "otherwise negative as stated in HPI.        Physical Examination:        Vitals:    01/23/25 1030   BP: 122/68   Pulse: 76     General: The patient is awake, oriented, and is not in any distress.  Head and Neck: Normocephalic. No periorbital edema.  Eyes: non-icteric  Respiratory: Symmetric chest expansion. No respiratory distress.  Skin: No maculopapular rash.  Musculoskeletal: No peripheral edema.  Neuro Exam: Speech is fluent. Moves extremities.    Imaging:         Blood Labs:  No results found for this or any previous visit (from the past 24 hours).   No results found for: \"BMPR1A\", \"PTH\", \"PROTUR\", \"PHOS\"   No results found for: \"GLUCOSE\", \"CALCIUM\", \"NA\", \"K\", \"CO2\", \"CL\", \"BUN\", \"CREATININE\"      Assessment and Plan:  1. Chronic kidney disease stage II/III. Last creatinine level is 1.5. Normal K level. Normal bicarb level.      2. Hypertension. Blood pressure is under control. Continue the current medications.     3. Hypercalcemia.  With normal PTH level.  Probably primary hyperparathyroidism.  He is following with his endocrinologist.     I will see him in about 6 months for follow-up.           Bobo Huber MD  Senior Attending Physician  Director of Onco-Nephrology Program  Division of Nephrology & Hypertension  [facility]  "

## 2025-05-05 ENCOUNTER — APPOINTMENT (OUTPATIENT)
Dept: PRIMARY CARE | Facility: CLINIC | Age: 70
End: 2025-05-05
Payer: MEDICARE

## 2025-05-05 VITALS
DIASTOLIC BLOOD PRESSURE: 72 MMHG | SYSTOLIC BLOOD PRESSURE: 124 MMHG | BODY MASS INDEX: 24.42 KG/M2 | TEMPERATURE: 97.2 F | HEIGHT: 67 IN | WEIGHT: 155.6 LBS | HEART RATE: 68 BPM

## 2025-05-05 DIAGNOSIS — N40.1 BENIGN PROSTATIC HYPERPLASIA WITH LOWER URINARY TRACT SYMPTOMS, SYMPTOM DETAILS UNSPECIFIED: ICD-10-CM

## 2025-05-05 DIAGNOSIS — E78.5 HYPERLIPIDEMIA, UNSPECIFIED HYPERLIPIDEMIA TYPE: ICD-10-CM

## 2025-05-05 DIAGNOSIS — G89.29 CHRONIC LOW BACK PAIN, UNSPECIFIED BACK PAIN LATERALITY, UNSPECIFIED WHETHER SCIATICA PRESENT: ICD-10-CM

## 2025-05-05 DIAGNOSIS — I10 ESSENTIAL (PRIMARY) HYPERTENSION: Primary | ICD-10-CM

## 2025-05-05 DIAGNOSIS — M54.50 CHRONIC LOW BACK PAIN, UNSPECIFIED BACK PAIN LATERALITY, UNSPECIFIED WHETHER SCIATICA PRESENT: ICD-10-CM

## 2025-05-05 PROCEDURE — 3008F BODY MASS INDEX DOCD: CPT | Performed by: FAMILY MEDICINE

## 2025-05-05 PROCEDURE — 3078F DIAST BP <80 MM HG: CPT | Performed by: FAMILY MEDICINE

## 2025-05-05 PROCEDURE — 99214 OFFICE O/P EST MOD 30 MIN: CPT | Performed by: FAMILY MEDICINE

## 2025-05-05 PROCEDURE — 1123F ACP DISCUSS/DSCN MKR DOCD: CPT | Performed by: FAMILY MEDICINE

## 2025-05-05 PROCEDURE — 1159F MED LIST DOCD IN RCRD: CPT | Performed by: FAMILY MEDICINE

## 2025-05-05 PROCEDURE — 3074F SYST BP LT 130 MM HG: CPT | Performed by: FAMILY MEDICINE

## 2025-05-05 RX ORDER — IRBESARTAN 75 MG/1
75 TABLET ORAL DAILY
Qty: 90 TABLET | Refills: 1 | Status: SHIPPED | OUTPATIENT
Start: 2025-05-05

## 2025-05-05 RX ORDER — PRAVASTATIN SODIUM 20 MG/1
20 TABLET ORAL NIGHTLY
Qty: 90 TABLET | Refills: 1 | Status: SHIPPED | OUTPATIENT
Start: 2025-05-05 | End: 2026-05-05

## 2025-05-05 ASSESSMENT — PATIENT HEALTH QUESTIONNAIRE - PHQ9: 1. LITTLE INTEREST OR PLEASURE IN DOING THINGS: NOT AT ALL

## 2025-05-05 NOTE — PROGRESS NOTES
"    /72   Pulse 68   Temp 36.2 °C (97.2 °F)   Ht 1.689 m (5' 6.5\")   Wt 70.6 kg (155 lb 9.6 oz)   BMI 24.74 kg/m²     Medical History[1]    Problem List[2]    Current Medications[3]    CC/HPI/ASSESSMENT/PLAN    CC follow medication    HPI patient with a history of hypertension and hyperlipidemia, blood work performed a few months ago reviewed.  Cholesterol in good control.  Kidney function stable blood sugar good.  All blood test results were reviewed.  Patient sees endocrinology for elevated calcium levels.  He sees pain management for chronic back pain as well as nephrology for stage III kidney disease.  Patient continues to smoke.  Denies fever chills chest pain abdominal pain blood in urine or stool.  Patient using Flomax for enlarged prostate with some relief of his symptoms.  ROS negative except noted above    Exam calm eyes no jaundice neck supple no carotid bruit.  Lungs CTA good air exchange.  CV RRR.  Skin no rash Ext no edema neuro alert and oriented no focal neurologic deficit noted    A/P 1.  Hypertension chronic stable 2 hyperlipidemia chronic stable.  Medications refilled.  Blood work was reviewed.  3.  BPH chronic stable patient will continue Flomax daily.  #4 chronic low back pain.  Patient will have follow-up with pain management as previously arranged.    There are no diagnoses linked to this encounter.          [1] History reviewed. No pertinent past medical history.  [2]   Patient Active Problem List  Diagnosis    Vitamin D deficiency    Status post cervical spinal fusion    Pulmonary nodule    Paresthesia    Other specified disorders of bone density and structure, multiple sites    Osteopenia    Nerve sheath tumor    Hypokalemia    Hyperuricemia    Essential (primary) hypertension    HLD (hyperlipidemia)    Hypercalcemia    Foraminal stenosis of lumbar region    Current every day smoker    Chronic kidney disease (CKD), stage III (moderate) (Multi)    Cervicalgia    Lumbar spondylosis "    Renal calculi    Bilateral fibular fractures    Iliac artery aneurysm (CMS-HCC)    Idiopathic scoliosis and kyphoscoliosis    Body mass index (BMI) 24.0-24.9, adult    Elevated serum creatinine    Abdominal pain    Anemia    Aortic ectasia    BPH (benign prostatic hyperplasia)    Diaphragmatic hernia    History of anemia    Left ureteral calculus    Lower back pain    Lumbar post-laminectomy syndrome    Osteoarthritis, multiple sites    Tobacco dependence syndrome    Weight loss    Left otitis media    Chronic pain disorder   [3]   Current Outpatient Medications   Medication Sig Dispense Refill    diclofenac sodium (Voltaren) 1 % gel gel Apply 4.5 inches (4 g) topically 4 times a day as needed.      ergocalciferol (Vitamin D-2) 1.25 MG (08083 UT) capsule Take 1 capsule (1.25 mg) by mouth every 30 (thirty) days.      irbesartan (Avapro) 75 mg tablet Take 1 tablet (75 mg) by mouth once daily. 90 tablet 1    oxyCODONE-acetaminophen (Percocet) 5-325 mg tablet 1 tablet once daily at bedtime.      pravastatin (Pravachol) 20 mg tablet Take 1 tablet (20 mg) by mouth once daily at bedtime. 90 tablet 1    predniSONE (Deltasone) 2.5 mg tablet 1 tablet (2.5 mg) once daily.      predniSONE (Deltasone) 5 mg tablet Take 1 tablet (5 mg) by mouth once daily.      tamsulosin (Flomax) 0.4 mg 24 hr capsule Take 1 capsule (0.4 mg) by mouth once daily.      traMADol (Ultram) 50 mg tablet Take 1 tablet (50 mg) by mouth every 12 hours if needed.       No current facility-administered medications for this visit.

## 2025-06-25 LAB
NON-UH HIE A/G RATIO: 1.2
NON-UH HIE ALB: 4.1 G/DL (ref 3.4–5)
NON-UH HIE ALK PHOS: 48 UNIT/L (ref 45–117)
NON-UH HIE BASO COUNT: 0.03 X1000 (ref 0–0.2)
NON-UH HIE BASOS %: 0.3 %
NON-UH HIE BILIRUBIN, TOTAL: 0.4 MG/DL (ref 0.3–1.2)
NON-UH HIE BUN/CREAT RATIO: 15.3
NON-UH HIE BUN/CREAT RATIO: 15.9
NON-UH HIE BUN: 26 MG/DL (ref 9–23)
NON-UH HIE BUN: 27 MG/DL (ref 9–23)
NON-UH HIE CALCIUM, IONIZED: 1.34 MMOL/L (ref 1.12–1.32)
NON-UH HIE CALCIUM: 10.8 MG/DL (ref 8.7–10.4)
NON-UH HIE CALCIUM: 10.8 MG/DL (ref 8.7–10.4)
NON-UH HIE CALCULATED OSMOLALITY: 280 MOSM/KG (ref 275–295)
NON-UH HIE CALCULATED OSMOLALITY: 282 MOSM/KG (ref 275–295)
NON-UH HIE CHLORIDE: 106 MMOL/L (ref 98–107)
NON-UH HIE CHLORIDE: 109 MMOL/L (ref 98–107)
NON-UH HIE CO2, VENOUS: 23 MMOL/L (ref 20–31)
NON-UH HIE CO2, VENOUS: 24 MMOL/L (ref 20–31)
NON-UH HIE CREATININE: 1.7 MG/DL (ref 0.6–1.1)
NON-UH HIE CREATININE: 1.7 MG/DL (ref 0.6–1.1)
NON-UH HIE DIFF?: NORMAL
NON-UH HIE EOS COUNT: 0.06 X1000 (ref 0–0.5)
NON-UH HIE EOSIN %: 0.7 %
NON-UH HIE GFR AA: 48
NON-UH HIE GFR AA: 48
NON-UH HIE GLOBULIN: 3.5 G/DL
NON-UH HIE GLOMERULAR FILTRATION RATE: 40 ML/MIN/1.73M?
NON-UH HIE GLOMERULAR FILTRATION RATE: 40 ML/MIN/1.73M?
NON-UH HIE GLUCOSE: 85 MG/DL (ref 74–106)
NON-UH HIE GLUCOSE: 86 MG/DL (ref 74–106)
NON-UH HIE GOT: 19 UNIT/L (ref 15–37)
NON-UH HIE GPT: 17 UNIT/L (ref 10–49)
NON-UH HIE HCT: 44.7 % (ref 41–52)
NON-UH HIE HGB: 14.9 G/DL (ref 13.5–17.5)
NON-UH HIE I-PTH: 22 PG/ML (ref 18.4–80.1)
NON-UH HIE INSTR WBC: 8.2
NON-UH HIE K: 4.1 MMOL/L (ref 3.5–5.1)
NON-UH HIE K: 4.1 MMOL/L (ref 3.5–5.1)
NON-UH HIE LDH: 80 UNIT/L (ref 120–246)
NON-UH HIE LYMPH %: 13.5 %
NON-UH HIE LYMPH COUNT: 1.11 X1000 (ref 1.2–4.8)
NON-UH HIE MAGNESIUM: 2.1 MG/DL (ref 1.6–2.6)
NON-UH HIE MCH: 31.1 PG (ref 27–34)
NON-UH HIE MCHC: 33.4 G/DL (ref 32–37)
NON-UH HIE MCV: 92.9 FL (ref 80–100)
NON-UH HIE MONO %: 6.4 %
NON-UH HIE MONO COUNT: 0.52 X1000 (ref 0.1–1)
NON-UH HIE MPV: 8.2 FL (ref 7.4–10.4)
NON-UH HIE NA: 138 MMOL/L (ref 135–145)
NON-UH HIE NA: 139 MMOL/L (ref 135–145)
NON-UH HIE NEUTROPHIL %: 79.1 %
NON-UH HIE NEUTROPHIL COUNT (ANC): 6.49 X1000 (ref 1.4–8.8)
NON-UH HIE NUCLEATED RBC: 0 /100WBC
NON-UH HIE PHOSPHORUS: 1.8 MG/DL (ref 2–5.1)
NON-UH HIE PLATELET: 245 X10 (ref 150–450)
NON-UH HIE RBC: 4.81 X10 (ref 4.7–6.1)
NON-UH HIE RDW: 14.3 % (ref 11.5–14.5)
NON-UH HIE TOTAL PROTEIN: 7.6 G/DL (ref 5.7–8.2)
NON-UH HIE VIT D 25: 57 NG/ML
NON-UH HIE WBC: 8.2 X10 (ref 4.5–11)

## 2025-06-26 ENCOUNTER — TELEPHONE (OUTPATIENT)
Dept: NEPHROLOGY | Facility: CLINIC | Age: 70
End: 2025-06-26
Payer: MEDICARE

## 2025-06-26 NOTE — TELEPHONE ENCOUNTER
----- Message from Bobo Huber sent at 6/26/2025 10:17 AM EDT -----  Kidney function remains diminished but it is stable with no major change.  High Ca level: needs to follow with his endocrinologist.  ----- Message -----  From: Johny Mullins - Lab Results In  Sent: 6/25/2025  11:29 AM EDT  To: Bobo Huber MD

## 2025-06-27 LAB — Lab: 3.2 MG/L

## 2025-06-28 LAB
NON-UH HIE EER MONOCLONAL PROTEIN AND FLC, SERUM: ABNORMAL
NON-UH HIE IGA: 184 MG/DL (ref 68–408)
NON-UH HIE IGG: 841 MG/DL (ref 768–1632)
NON-UH HIE IGM: 185 MG/DL (ref 35–263)
NON-UH HIE IMMUNOFIXATION: ABNORMAL
NON-UH HIE KAPPA QNT FREE LIGHT CHAINS: 33.73 MG/L (ref 3.3–19.4)
NON-UH HIE KAPPA/LAMBDA FREE LIGHT CHAIN RATIO: 1.82 (ref 0.26–1.65)
NON-UH HIE LAMBDA QNT FREE LIGHT CHAINS: 18.49 MG/L (ref 5.71–26.3)
NON-UH HIE MONOCLONAL PROTEIN: ABNORMAL G/DL
NON-UH HIE SPE ALBUMIN: 4.37 G/DL (ref 3.75–5.01)
NON-UH HIE SPE ALPHA 1 GLOBULIN: 0.37 G/DL (ref 0.19–0.46)
NON-UH HIE SPE ALPHA 2 GLOBULIN: 0.97 G/DL (ref 0.48–1.05)
NON-UH HIE SPE BETA GLOBULIN: 0.81 G/DL (ref 0.48–1.1)
NON-UH HIE SPE GAMMA GLOBULIN: 0.87 G/DL (ref 0.62–1.51)
NON-UH HIE SPE TOTAL PROTEIN: 7.4 G/DL (ref 6.3–8.2)
NON-UH HIE SPEP/IFE INTERP: ABNORMAL
NON-UH HIE U IFE INTERP: NORMAL

## 2025-06-29 LAB — Lab: 3.8 (ref 0–2.3)

## 2025-06-30 LAB — NON-UH HIE MISC SENDOUT: NORMAL

## 2025-07-14 LAB
NON-UH HIE C-REACTIVE PROTEIN, QUANTITATIVE: <0.5 MG/DL (ref 0–0.5)
NON-UH HIE SED RATE WESTERGREN: 25 MM/HR (ref 0–20)

## 2025-07-23 LAB
NON-UH HIE APPEARANCE, U: ABNORMAL
NON-UH HIE APTT PATIENT: 31.6 SECONDS (ref 26–36)
NON-UH HIE BACTERIA, U: ABNORMAL
NON-UH HIE BILIRUBIN, U: ABNORMAL
NON-UH HIE BLOOD, U: ABNORMAL
NON-UH HIE COLOR, U: ABNORMAL
NON-UH HIE GLUCOSE QUAL, U: ABNORMAL
NON-UH HIE INR: 0.9
NON-UH HIE KETONES, U: ABNORMAL
NON-UH HIE LEUKOCYTE ESTERASE, U: ABNORMAL
NON-UH HIE NITRITE, U: ABNORMAL
NON-UH HIE PH, U: 5.5 (ref 4.5–8)
NON-UH HIE PROTEIN, U: ABNORMAL
NON-UH HIE PROTIME PATIENT: 10.1 SECONDS (ref 9.8–12.4)
NON-UH HIE RBC/HPF, U: 2 #/HPF (ref 0–3)
NON-UH HIE SPECIFIC GRAVITY, U: 1.01 (ref 1–1.03)
NON-UH HIE U MICRO: ABNORMAL
NON-UH HIE UROBILINOGEN QUAL, U: ABNORMAL
NON-UH HIE WBC/HPF, U: 9 #/HPF (ref 0–5)

## 2025-07-24 ENCOUNTER — APPOINTMENT (OUTPATIENT)
Dept: NEPHROLOGY | Facility: CLINIC | Age: 70
End: 2025-07-24
Payer: MEDICARE

## 2025-07-24 VITALS
HEIGHT: 67 IN | BODY MASS INDEX: 24.04 KG/M2 | DIASTOLIC BLOOD PRESSURE: 68 MMHG | WEIGHT: 153.2 LBS | HEART RATE: 78 BPM | SYSTOLIC BLOOD PRESSURE: 130 MMHG

## 2025-07-24 DIAGNOSIS — N18.31 STAGE 3A CHRONIC KIDNEY DISEASE (MULTI): Primary | ICD-10-CM

## 2025-07-24 PROCEDURE — 3008F BODY MASS INDEX DOCD: CPT | Performed by: INTERNAL MEDICINE

## 2025-07-24 PROCEDURE — 1159F MED LIST DOCD IN RCRD: CPT | Performed by: INTERNAL MEDICINE

## 2025-07-24 PROCEDURE — 3078F DIAST BP <80 MM HG: CPT | Performed by: INTERNAL MEDICINE

## 2025-07-24 PROCEDURE — 3075F SYST BP GE 130 - 139MM HG: CPT | Performed by: INTERNAL MEDICINE

## 2025-07-24 PROCEDURE — 99213 OFFICE O/P EST LOW 20 MIN: CPT | Performed by: INTERNAL MEDICINE

## 2025-07-24 NOTE — PROGRESS NOTES
"Augustin Lay   69 y[place][age].    @@  [facility]/Room: [id number]/Room/bed info not found    Subjective:   The patient is being seen for a routine clinic follow-up of chronic kidney disease. Recently, the disease has been stable. Disease complications:  No hyperkalemia, no hypocalcemia, no hyperphosphatemia, no metabolic acidosis, no coagulopathy, no uremic encephalopathy, no neuropathy and no renal osteodystrophy. The patient is currently asymptomatic. No associated symptoms are reported.       Meds:   Current Medications[1]       ROS:  The patient is awake and oriented. No dizziness or lightheadedness. No chills and no fever. No headaches. No nausea and no vomiting. No shortness of breath. No cough. No chest pain. No abdominal pain. No diarrhea. No hematemesis or hemoptysis. No hematuria. No rectal bleeding. No melena. No epistaxis. No urinary symptoms. No flank pain. No leg edema. No itching. Overall, the rest of the review of systems is also negative.  12 point review of systems otherwise negative as stated in HPI.        Physical Examination:        Vitals:    07/24/25 1120   BP: 130/68   Pulse: 78     General: The patient is awake, oriented, and is not in any distress.  Head and Neck: Normocephalic. No periorbital edema.  Eyes: non-icteric  Respiratory: Symmetric chest expansion. No respiratory distress.  Skin: No maculopapular rash.  Musculoskeletal: No peripheral edema.  Neuro Exam: Speech is fluent. Moves extremities.    Imaging:         Blood Labs:  No results found for this or any previous visit (from the past 24 hours).   No results found for: \"BMPR1A\", \"PTH\", \"PROTUR\", \"PHOS\"   No results found for: \"GLUCOSE\", \"CALCIUM\", \"NA\", \"K\", \"CO2\", \"CL\", \"BUN\", \"CREATININE\"          Assessment and Plan:  1. Chronic kidney disease stage II/III. Last creatinine level is 1.7. Normal K level. Normal bicarb level.      2. Hypertension. Blood pressure is under control. Continue the current medications.     3. Hypercalcemia.  With " normal PTH level.  Probably primary hyperparathyroidism.  He is following with his endocrinologist.     I will see him in about 6 months for follow-up.           Bobo Huber MD  Senior Attending Physician  Director of Onco-Nephrology Program  Division of Nephrology & Hypertension  [facility]       [1]   Current Outpatient Medications   Medication Sig Dispense Refill    diclofenac sodium (Voltaren) 1 % gel gel Apply 4.5 inches (4 g) topically 4 times a day as needed.      ergocalciferol (Vitamin D-2) 1.25 MG (45189 UT) capsule Take 1 capsule (1.25 mg) by mouth every 30 (thirty) days.      irbesartan (Avapro) 75 mg tablet Take 1 tablet (75 mg) by mouth once daily. 90 tablet 1    oxyCODONE-acetaminophen (Percocet) 5-325 mg tablet 1 tablet once daily at bedtime.      pravastatin (Pravachol) 20 mg tablet Take 1 tablet (20 mg) by mouth once daily at bedtime. 90 tablet 1    predniSONE (Deltasone) 2.5 mg tablet 1 tablet (2.5 mg) once daily.      predniSONE (Deltasone) 5 mg tablet Take 1 tablet (5 mg) by mouth once daily.      tamsulosin (Flomax) 0.4 mg 24 hr capsule Take 1 capsule (0.4 mg) by mouth once daily.      traMADol (Ultram) 50 mg tablet Take 1 tablet (50 mg) by mouth every 12 hours if needed.       No current facility-administered medications for this visit.

## 2025-07-30 LAB
NON-UH HIE A/G RATIO: 1.3
NON-UH HIE ALB: 4 G/DL (ref 3.4–5)
NON-UH HIE ALK PHOS: 45 UNIT/L (ref 45–117)
NON-UH HIE BILIRUBIN, TOTAL: 0.4 MG/DL (ref 0.3–1.2)
NON-UH HIE BUN/CREAT RATIO: 12.1
NON-UH HIE BUN: 17 MG/DL (ref 9–23)
NON-UH HIE CALCIUM: 10.8 MG/DL (ref 8.7–10.4)
NON-UH HIE CALCULATED OSMOLALITY: 284 MOSM/KG (ref 275–295)
NON-UH HIE CHLORIDE: 104 MMOL/L (ref 98–107)
NON-UH HIE CO2, VENOUS: 26 MMOL/L (ref 20–31)
NON-UH HIE CREATININE: 1.4 MG/DL (ref 0.6–1.1)
NON-UH HIE GFR AA: >60
NON-UH HIE GLOBULIN: 3.2 G/DL
NON-UH HIE GLOMERULAR FILTRATION RATE: 50 ML/MIN/1.73M?
NON-UH HIE GLUCOSE: 82 MG/DL (ref 74–106)
NON-UH HIE GOT: 18 UNIT/L (ref 15–37)
NON-UH HIE GPT: 18 UNIT/L (ref 10–49)
NON-UH HIE K: 3.8 MMOL/L (ref 3.5–5.1)
NON-UH HIE NA: 142 MMOL/L (ref 135–145)
NON-UH HIE TOTAL PROTEIN: 7.2 G/DL (ref 5.7–8.2)

## 2025-09-04 DIAGNOSIS — Z00.00 WELL ADULT EXAM: ICD-10-CM

## 2026-01-22 ENCOUNTER — APPOINTMENT (OUTPATIENT)
Dept: NEPHROLOGY | Facility: CLINIC | Age: 71
End: 2026-01-22
Payer: MEDICARE